# Patient Record
Sex: FEMALE | Race: ASIAN | NOT HISPANIC OR LATINO | URBAN - METROPOLITAN AREA
[De-identification: names, ages, dates, MRNs, and addresses within clinical notes are randomized per-mention and may not be internally consistent; named-entity substitution may affect disease eponyms.]

---

## 2022-10-27 ENCOUNTER — EMERGENCY (EMERGENCY)
Facility: HOSPITAL | Age: 52
LOS: 1 days | Discharge: HOME | End: 2022-10-27
Attending: EMERGENCY MEDICINE
Payer: MEDICAID

## 2022-10-27 VITALS
RESPIRATION RATE: 20 BRPM | HEIGHT: 65 IN | TEMPERATURE: 98 F | HEART RATE: 92 BPM | SYSTOLIC BLOOD PRESSURE: 148 MMHG | DIASTOLIC BLOOD PRESSURE: 100 MMHG | OXYGEN SATURATION: 100 % | WEIGHT: 145.06 LBS

## 2022-10-27 LAB
ALBUMIN SERPL ELPH-MCNC: 4.7 G/DL — SIGNIFICANT CHANGE UP (ref 3.3–5)
ALP SERPL-CCNC: 110 U/L — SIGNIFICANT CHANGE UP (ref 40–120)
ALT FLD-CCNC: 26 U/L — SIGNIFICANT CHANGE UP (ref 10–45)
ANION GAP SERPL CALC-SCNC: 11 MMOL/L — SIGNIFICANT CHANGE UP (ref 5–17)
APPEARANCE UR: CLEAR — SIGNIFICANT CHANGE UP
APTT BLD: 30.5 SEC — SIGNIFICANT CHANGE UP (ref 27.5–35.5)
AST SERPL-CCNC: 21 U/L — SIGNIFICANT CHANGE UP (ref 10–40)
BACTERIA # UR AUTO: ABNORMAL
BASE EXCESS BLDV CALC-SCNC: 2.5 MMOL/L — SIGNIFICANT CHANGE UP (ref -2–3)
BASOPHILS # BLD AUTO: 0.03 K/UL — SIGNIFICANT CHANGE UP (ref 0–0.2)
BASOPHILS NFR BLD AUTO: 0.5 % — SIGNIFICANT CHANGE UP (ref 0–2)
BILIRUB SERPL-MCNC: 0.3 MG/DL — SIGNIFICANT CHANGE UP (ref 0.2–1.2)
BILIRUB UR-MCNC: NEGATIVE — SIGNIFICANT CHANGE UP
BUN SERPL-MCNC: 8 MG/DL — SIGNIFICANT CHANGE UP (ref 7–23)
CA-I SERPL-SCNC: 1.26 MMOL/L — SIGNIFICANT CHANGE UP (ref 1.15–1.33)
CALCIUM SERPL-MCNC: 10.4 MG/DL — SIGNIFICANT CHANGE UP (ref 8.4–10.5)
CHLORIDE BLDV-SCNC: 103 MMOL/L — SIGNIFICANT CHANGE UP (ref 96–108)
CHLORIDE SERPL-SCNC: 103 MMOL/L — SIGNIFICANT CHANGE UP (ref 96–108)
CO2 BLDV-SCNC: 29 MMOL/L — HIGH (ref 22–26)
CO2 SERPL-SCNC: 26 MMOL/L — SIGNIFICANT CHANGE UP (ref 22–31)
COLOR SPEC: COLORLESS — SIGNIFICANT CHANGE UP
CREAT SERPL-MCNC: 0.77 MG/DL — SIGNIFICANT CHANGE UP (ref 0.5–1.3)
DIFF PNL FLD: NEGATIVE — SIGNIFICANT CHANGE UP
EGFR: 93 ML/MIN/1.73M2 — SIGNIFICANT CHANGE UP
EOSINOPHIL # BLD AUTO: 0.16 K/UL — SIGNIFICANT CHANGE UP (ref 0–0.5)
EOSINOPHIL NFR BLD AUTO: 2.5 % — SIGNIFICANT CHANGE UP (ref 0–6)
EPI CELLS # UR: 5 /HPF — SIGNIFICANT CHANGE UP
GAS PNL BLDV: 136 MMOL/L — SIGNIFICANT CHANGE UP (ref 136–145)
GAS PNL BLDV: SIGNIFICANT CHANGE UP
GAS PNL BLDV: SIGNIFICANT CHANGE UP
GLUCOSE BLDV-MCNC: 94 MG/DL — SIGNIFICANT CHANGE UP (ref 70–99)
GLUCOSE SERPL-MCNC: 94 MG/DL — SIGNIFICANT CHANGE UP (ref 70–99)
GLUCOSE UR QL: NEGATIVE — SIGNIFICANT CHANGE UP
HCG UR QL: NEGATIVE — SIGNIFICANT CHANGE UP
HCO3 BLDV-SCNC: 28 MMOL/L — SIGNIFICANT CHANGE UP (ref 22–29)
HCT VFR BLD CALC: 42 % — SIGNIFICANT CHANGE UP (ref 34.5–45)
HCT VFR BLDA CALC: 41 % — SIGNIFICANT CHANGE UP (ref 34.5–46.5)
HGB BLD CALC-MCNC: 13.7 G/DL — SIGNIFICANT CHANGE UP (ref 11.7–16.1)
HGB BLD-MCNC: 14 G/DL — SIGNIFICANT CHANGE UP (ref 11.5–15.5)
HYALINE CASTS # UR AUTO: 1 /LPF — SIGNIFICANT CHANGE UP (ref 0–2)
IMM GRANULOCYTES NFR BLD AUTO: 0.3 % — SIGNIFICANT CHANGE UP (ref 0–0.9)
INR BLD: 1.05 RATIO — SIGNIFICANT CHANGE UP (ref 0.88–1.16)
KETONES UR-MCNC: NEGATIVE — SIGNIFICANT CHANGE UP
LACTATE BLDV-MCNC: 1.3 MMOL/L — SIGNIFICANT CHANGE UP (ref 0.5–2)
LEUKOCYTE ESTERASE UR-ACNC: ABNORMAL
LIDOCAIN IGE QN: 48 U/L — SIGNIFICANT CHANGE UP (ref 7–60)
LYMPHOCYTES # BLD AUTO: 2.84 K/UL — SIGNIFICANT CHANGE UP (ref 1–3.3)
LYMPHOCYTES # BLD AUTO: 44.2 % — HIGH (ref 13–44)
MCHC RBC-ENTMCNC: 28.6 PG — SIGNIFICANT CHANGE UP (ref 27–34)
MCHC RBC-ENTMCNC: 33.3 GM/DL — SIGNIFICANT CHANGE UP (ref 32–36)
MCV RBC AUTO: 85.7 FL — SIGNIFICANT CHANGE UP (ref 80–100)
MONOCYTES # BLD AUTO: 0.38 K/UL — SIGNIFICANT CHANGE UP (ref 0–0.9)
MONOCYTES NFR BLD AUTO: 5.9 % — SIGNIFICANT CHANGE UP (ref 2–14)
NEUTROPHILS # BLD AUTO: 2.99 K/UL — SIGNIFICANT CHANGE UP (ref 1.8–7.4)
NEUTROPHILS NFR BLD AUTO: 46.6 % — SIGNIFICANT CHANGE UP (ref 43–77)
NITRITE UR-MCNC: NEGATIVE — SIGNIFICANT CHANGE UP
NRBC # BLD: 0 /100 WBCS — SIGNIFICANT CHANGE UP (ref 0–0)
PCO2 BLDV: 45 MMHG — HIGH (ref 39–42)
PH BLDV: 7.4 — SIGNIFICANT CHANGE UP (ref 7.32–7.43)
PH UR: 7 — SIGNIFICANT CHANGE UP (ref 5–8)
PLATELET # BLD AUTO: 265 K/UL — SIGNIFICANT CHANGE UP (ref 150–400)
PO2 BLDV: 43 MMHG — SIGNIFICANT CHANGE UP (ref 25–45)
POTASSIUM BLDV-SCNC: 4 MMOL/L — SIGNIFICANT CHANGE UP (ref 3.5–5.1)
POTASSIUM SERPL-MCNC: 4.6 MMOL/L — SIGNIFICANT CHANGE UP (ref 3.5–5.3)
POTASSIUM SERPL-SCNC: 4.6 MMOL/L — SIGNIFICANT CHANGE UP (ref 3.5–5.3)
PROT SERPL-MCNC: 8.2 G/DL — SIGNIFICANT CHANGE UP (ref 6–8.3)
PROT UR-MCNC: NEGATIVE — SIGNIFICANT CHANGE UP
PROTHROM AB SERPL-ACNC: 12.2 SEC — SIGNIFICANT CHANGE UP (ref 10.5–13.4)
RBC # BLD: 4.9 M/UL — SIGNIFICANT CHANGE UP (ref 3.8–5.2)
RBC # FLD: 13.2 % — SIGNIFICANT CHANGE UP (ref 10.3–14.5)
RBC CASTS # UR COMP ASSIST: 0 /HPF — SIGNIFICANT CHANGE UP (ref 0–4)
SAO2 % BLDV: 74.6 % — SIGNIFICANT CHANGE UP (ref 67–88)
SODIUM SERPL-SCNC: 140 MMOL/L — SIGNIFICANT CHANGE UP (ref 135–145)
SP GR SPEC: 1 — LOW (ref 1.01–1.02)
TROPONIN T, HIGH SENSITIVITY RESULT: <6 NG/L — SIGNIFICANT CHANGE UP (ref 0–51)
UROBILINOGEN FLD QL: NEGATIVE — SIGNIFICANT CHANGE UP
WBC # BLD: 6.42 K/UL — SIGNIFICANT CHANGE UP (ref 3.8–10.5)
WBC # FLD AUTO: 6.42 K/UL — SIGNIFICANT CHANGE UP (ref 3.8–10.5)
WBC UR QL: 11 /HPF — HIGH (ref 0–5)

## 2022-10-27 PROCEDURE — 82803 BLOOD GASES ANY COMBINATION: CPT

## 2022-10-27 PROCEDURE — 82435 ASSAY OF BLOOD CHLORIDE: CPT

## 2022-10-27 PROCEDURE — 87086 URINE CULTURE/COLONY COUNT: CPT

## 2022-10-27 PROCEDURE — 84295 ASSAY OF SERUM SODIUM: CPT

## 2022-10-27 PROCEDURE — 80053 COMPREHEN METABOLIC PANEL: CPT

## 2022-10-27 PROCEDURE — 99285 EMERGENCY DEPT VISIT HI MDM: CPT | Mod: 25

## 2022-10-27 PROCEDURE — 74177 CT ABD & PELVIS W/CONTRAST: CPT | Mod: MA

## 2022-10-27 PROCEDURE — 81001 URINALYSIS AUTO W/SCOPE: CPT

## 2022-10-27 PROCEDURE — 84484 ASSAY OF TROPONIN QUANT: CPT

## 2022-10-27 PROCEDURE — 83690 ASSAY OF LIPASE: CPT

## 2022-10-27 PROCEDURE — 87186 SC STD MICRODIL/AGAR DIL: CPT

## 2022-10-27 PROCEDURE — 82947 ASSAY GLUCOSE BLOOD QUANT: CPT

## 2022-10-27 PROCEDURE — 83605 ASSAY OF LACTIC ACID: CPT

## 2022-10-27 PROCEDURE — 93005 ELECTROCARDIOGRAM TRACING: CPT

## 2022-10-27 PROCEDURE — 74177 CT ABD & PELVIS W/CONTRAST: CPT | Mod: 26,MA

## 2022-10-27 PROCEDURE — 36415 COLL VENOUS BLD VENIPUNCTURE: CPT

## 2022-10-27 PROCEDURE — 99285 EMERGENCY DEPT VISIT HI MDM: CPT

## 2022-10-27 PROCEDURE — 85018 HEMOGLOBIN: CPT

## 2022-10-27 PROCEDURE — 87040 BLOOD CULTURE FOR BACTERIA: CPT

## 2022-10-27 PROCEDURE — 82330 ASSAY OF CALCIUM: CPT

## 2022-10-27 PROCEDURE — 85610 PROTHROMBIN TIME: CPT

## 2022-10-27 PROCEDURE — 85730 THROMBOPLASTIN TIME PARTIAL: CPT

## 2022-10-27 PROCEDURE — 81025 URINE PREGNANCY TEST: CPT

## 2022-10-27 PROCEDURE — 84132 ASSAY OF SERUM POTASSIUM: CPT

## 2022-10-27 PROCEDURE — 85014 HEMATOCRIT: CPT

## 2022-10-27 PROCEDURE — 85025 COMPLETE CBC W/AUTO DIFF WBC: CPT

## 2022-10-27 RX ORDER — CEFPODOXIME PROXETIL 100 MG
1 TABLET ORAL
Qty: 20 | Refills: 0
Start: 2022-10-27 | End: 2022-11-05

## 2022-10-27 RX ORDER — CEFPODOXIME PROXETIL 100 MG
100 TABLET ORAL EVERY 12 HOURS
Refills: 0 | Status: DISCONTINUED | OUTPATIENT
Start: 2022-10-27 | End: 2022-10-31

## 2022-10-27 RX ORDER — SODIUM CHLORIDE 9 MG/ML
2000 INJECTION, SOLUTION INTRAVENOUS ONCE
Refills: 0 | Status: COMPLETED | OUTPATIENT
Start: 2022-10-27 | End: 2022-10-27

## 2022-10-27 RX ORDER — ACETAMINOPHEN 500 MG
650 TABLET ORAL ONCE
Refills: 0 | Status: COMPLETED | OUTPATIENT
Start: 2022-10-27 | End: 2022-10-27

## 2022-10-27 RX ADMIN — Medication 100 MILLIGRAM(S): at 23:39

## 2022-10-27 RX ADMIN — Medication 650 MILLIGRAM(S): at 21:46

## 2022-10-27 RX ADMIN — SODIUM CHLORIDE 2000 MILLILITER(S): 9 INJECTION, SOLUTION INTRAVENOUS at 19:00

## 2022-10-27 NOTE — ED ADULT NURSE REASSESSMENT NOTE - NS ED NURSE REASSESS COMMENT FT1
Report received by ASHKAN Deluca. Pt received A&Ox4 c/o of HA and abd tenderness, given PO pain medication as per MD order. Vials signs taken. Pt updated on plan of care and verbalized understanding. Awaiting results for CT scan.

## 2022-10-27 NOTE — ED PROVIDER NOTE - PHYSICAL EXAMINATION
GENERAL: Alert. No acute distress.   EYES: EOMI grossly normal. Anicteric.  HENT: Moist mucous membranes.   RESP: No conversation dyspnea, CTAB  CARDIOVASCULAR: RRR, no m/g/r  ABDOMEN: soft, LUQ and LLQ ttp, no rebound no guarding, no CVA tenderness  EXTREMITIES: ROM grossly normal in all 4 extremities. No deformities  SKIN: warm and dry  NEUROLOGIC: Alert and oriented x3  PSYCHIATRIC: Cooperative. Appropriate mood and affect

## 2022-10-27 NOTE — ED PROVIDER NOTE - CHIEF COMPLAINT
The patient is a 51y Female complaining of  The patient is a 51y Female complaining of left sided abd pain

## 2022-10-27 NOTE — ED PROVIDER NOTE - OBJECTIVE STATEMENT
50 yo with PMHx of diverticulosis, HTN, and hypothyroidism, presents for left sided abd pain. Pt lives at [facility], arrived in [place] on Oct 16th. Seen at [facility] for left sided abd pain on Oct 18th. CT A/p without contrast showed diverticulosis. Pt was seen at [facility] again on Oct 25th (two days ago) for left sided abd pain, dx with UTI, given a dose of macrobid in the hospital. Did not  her script for the rest of the macrobid. Today, presented to this ED for worsening burning left sided abdominal pain, with radiation from LLQ to LUQ. Also reports back pain. No N/V, some diarrhea, some maroon mucus in stool. + chills. No prior colonoscopy.

## 2022-10-27 NOTE — ED PROVIDER NOTE - PROGRESS NOTE DETAILS
Geoff PGY 2 Received sign out regarding patient, will follow up with labs, imaging.   currently pending ct scan and labs Patient notes improvement in pain from 10/10 to 5/10

## 2022-10-27 NOTE — ED PROVIDER NOTE - NSICDXFAMILYHX_GEN_ALL_CORE_FT
FAMILY HISTORY:  Father  Still living? Unknown  Family history of kidney stone, Age at diagnosis: Age Unknown

## 2022-10-27 NOTE — ED ADULT NURSE NOTE - CAS TRG GEN SKIN CONDITION
Assessment and plan of treatment:	plan: labs, meds, urine, upreg, CT, reassess.   Warm/Dry 1 Principal Discharge DX:	Back pain  Assessment and plan of treatment:	plan: labs, meds, urine, upreg, CT, reassess.

## 2022-10-27 NOTE — ED PROVIDER NOTE - NSFOLLOWUPINSTRUCTIONS_ED_ALL_ED_FT
Today you were seen in the ED for abdominal pain.    It was found that you have  no signs of a medical emergency that needs further treatment in the hospital. We are going to treat you though for a suspected infection in your kidney.    Please take cefpodoxime 100mg every 12 hours, for 10 days.     You can take Tylenol and Motrin every 8 hours for pain as needed.     Please follow up with your gastroenterologists / PCP for continued care and management.     [address] [place] [address]  [phone]    In addition, please follow up with a nephrologist as well, information for such can be found below.     Please return to the ED if you have an increase in abdominal pain, nausea, vomiting or pain that is uncontrolled with your home pain relief.

## 2022-10-27 NOTE — ED PROVIDER NOTE - NS ED ROS FT
CONSTITUTIONAL: No fever or chill  HEENT: Denies changes in vision and hearing.  RESPIRATORY: Denies SOB and cough.  CV: Denies CP.   GI: (+) abdominal pain, diarrhea. (-) n/v  : Denies dysuria (+) urinary frequency.  MSK: Denies myalgia and joint pain. Back pain  SKIN: Denies rash   ?NEUROLOGICAL: Denies headache and syncope.

## 2022-10-27 NOTE — ED ADULT TRIAGE NOTE - CHIEF COMPLAINT QUOTE
pt left side abd pain x 1 1/2 wks seen at [facility] 2 days ago put on abx for uti

## 2022-10-27 NOTE — ED PROVIDER NOTE - CLINICAL SUMMARY MEDICAL DECISION MAKING FREE TEXT BOX
52 yo F here for left sided abdominal pain ongoing for the past two weeks. Concern for UTI to pyelo, diverticulosis, other abdominal pathology. Lab, CT a/p with contrast, dispo pending work up. 52 yo F here for left sided abdominal pain ongoing for the past two weeks. Concern for UTI to pyelo, diverticulosis, other abdominal pathology. Lab, CT a/p with contrast, dispo pending work up.    LOVE Garcia MD: Agree with resident/ACP MDM, assessment and plan as above.

## 2022-10-27 NOTE — ED PROVIDER NOTE - NSFOLLOWUPCLINICS_GEN_ALL_ED_FT
Gastroenterology at [facility]  Gastroenterology  [address] [place] [address]  Phone: [phone]  Fax:   Follow Up Time: 1-3 Days    [facility]  Nephrology  [address] [place] [address]  Phone: [phone]  Fax:   Follow Up Time: 1-3 Days

## 2022-10-28 VITALS
OXYGEN SATURATION: 99 % | TEMPERATURE: 98 F | HEART RATE: 71 BPM | SYSTOLIC BLOOD PRESSURE: 153 MMHG | RESPIRATION RATE: 17 BRPM | DIASTOLIC BLOOD PRESSURE: 104 MMHG

## 2022-10-28 NOTE — ED ADULT NURSE REASSESSMENT NOTE - NS ED NURSE REASSESS COMMENT FT1
Pt provided discharge instructions and verbalized understanding. Vitals taken prior to discharge, MD Tellez made aware of elevated BP and notified pt to take her home BP meds. Pt ambulated independently and denies any difficulty walking. No acute distress and asymptomatic to elevated BP.

## 2022-11-01 PROBLEM — E03.9 HYPOTHYROIDISM, UNSPECIFIED: Chronic | Status: ACTIVE | Noted: 2022-10-27

## 2022-11-01 PROBLEM — I10 ESSENTIAL (PRIMARY) HYPERTENSION: Chronic | Status: ACTIVE | Noted: 2022-10-27

## 2022-11-01 PROBLEM — K57.90 DIVERTICULOSIS OF INTESTINE, PART UNSPECIFIED, WITHOUT PERFORATION OR ABSCESS WITHOUT BLEEDING: Chronic | Status: ACTIVE | Noted: 2022-10-27

## 2022-11-01 PROBLEM — Z00.00 ENCOUNTER FOR PREVENTIVE HEALTH EXAMINATION: Status: ACTIVE | Noted: 2022-11-01

## 2022-11-02 LAB
CULTURE RESULTS: SIGNIFICANT CHANGE UP
CULTURE RESULTS: SIGNIFICANT CHANGE UP
SPECIMEN SOURCE: SIGNIFICANT CHANGE UP
SPECIMEN SOURCE: SIGNIFICANT CHANGE UP

## 2022-12-30 ENCOUNTER — APPOINTMENT (OUTPATIENT)
Dept: NEPHROLOGY | Facility: CLINIC | Age: 52
End: 2022-12-30
Payer: COMMERCIAL

## 2022-12-30 VITALS
OXYGEN SATURATION: 98 % | DIASTOLIC BLOOD PRESSURE: 76 MMHG | BODY MASS INDEX: 26.52 KG/M2 | WEIGHT: 165 LBS | HEIGHT: 66 IN | HEART RATE: 78 BPM | SYSTOLIC BLOOD PRESSURE: 128 MMHG | RESPIRATION RATE: 16 BRPM

## 2022-12-30 DIAGNOSIS — N20.0 CALCULUS OF KIDNEY: ICD-10-CM

## 2022-12-30 DIAGNOSIS — N39.0 URINARY TRACT INFECTION, SITE NOT SPECIFIED: ICD-10-CM

## 2022-12-30 DIAGNOSIS — Z86.79 PERSONAL HISTORY OF OTHER DISEASES OF THE CIRCULATORY SYSTEM: ICD-10-CM

## 2022-12-30 DIAGNOSIS — Z86.39 PERSONAL HISTORY OF OTHER ENDOCRINE, NUTRITIONAL AND METABOLIC DISEASE: ICD-10-CM

## 2022-12-30 DIAGNOSIS — N28.1 CYST OF KIDNEY, ACQUIRED: ICD-10-CM

## 2022-12-30 PROCEDURE — 99203 OFFICE O/P NEW LOW 30 MIN: CPT

## 2022-12-30 RX ORDER — LEVOTHYROXINE SODIUM 0.09 MG/1
88 TABLET ORAL DAILY
Refills: 0 | Status: ACTIVE | COMMUNITY
Start: 2022-12-30

## 2022-12-30 RX ORDER — AMLODIPINE BESYLATE 5 MG/1
5 TABLET ORAL DAILY
Qty: 30 | Refills: 3 | Status: ACTIVE | COMMUNITY
Start: 2022-12-30

## 2022-12-30 NOTE — ASSESSMENT
[FreeTextEntry1] : 1. The patient had a renal US in [facility] and was told she had several cysts.  On the basis of this information I have requested an US to be done in January for my review and comments. The patient will call my office after the test is done.\par 2 Recurrent UTI. The patient states that she usually has 2 to 3 UTI per year. She is scheduled for a GYN evaluation in January.  \par 3. History of nephrolithiasis. Will review if stone are present in US and plan accordingly.\par Follow up as necessary. \par I have discussed the above w/ the patient and her  who showed full understanding of the situation.

## 2022-12-30 NOTE — PHYSICAL EXAM
[General Appearance - Alert] : alert [General Appearance - In No Acute Distress] : in no acute distress [Sclera] : the sclera and conjunctiva were normal [Outer Ear] : the ears and nose were normal in appearance [Jugular Venous Distention Increased] : there was no jugular-venous distention [Auscultation Breath Sounds / Voice Sounds] : lungs were clear to auscultation bilaterally [Murmurs] : no murmurs [Edema] : there was no peripheral edema [Abdomen Tenderness] : non-tender [] : no hepato-splenomegaly [Urinary Bladder Findings] : the bladder was normal on palpation [No CVA Tenderness] : no ~M costovertebral angle tenderness [Abnormal Walk] : normal gait [No Focal Deficits] : no focal deficits [Oriented To Time, Place, And Person] : oriented to person, place, and time

## 2022-12-30 NOTE — REASON FOR VISIT
[Consultation] : a consultation visit [Spouse] : spouse [FreeTextEntry1] : Referred for recurrent UTI, renal cyst on US and history of nephrolithiasis.

## 2022-12-30 NOTE — CONSULT LETTER
[Dear  ___] : Dear  [unfilled], [Courtesy Letter:] : I had the pleasure of seeing your patient, [unfilled], in my office today. [Please see my note below.] : Please see my note below. [Consult Closing:] : Thank you very much for allowing me to participate in the care of this patient.  If you have any questions, please do not hesitate to contact me. [Sincerely,] : Sincerely, [FreeTextEntry2] : Dr. Roshni Acuña\par [address] [facility]\[place] [address] [FreeTextEntry1] : I had the pleasure of seen this patient in my office on December 30.  She was referred from the ED at [facility] where she presented in October 2022 w/ left flank pain and was given an antibiotic for a UTI.  Her renal function was normal. The patient has history of renal cysts and kidney stones. I have ordered a renal US and will forward the results to your office when available. Her blood pressure was normal on her current medications.  [FreeTextEntry3] : Christiano Bar MD

## 2022-12-30 NOTE — HISTORY OF PRESENT ILLNESS
[FreeTextEntry1] : Referred after an ED visit at [facility] in October when she presented w/ left flank pain and urinalysis suggestive of UTI. Treated w/ oral antibiotics and discharged.  The patient has had UTI 2 to 3 times per year but has never required IV antibiotics/admission to the hospital.  She gives history of nephrolithiasis the last episode was few years ago.  She had a renal US done in [facility] and was told that her kidneys had few cysts. No mention of stones. \par The patient was told to follow up w/ a nephrologist upon discharge from the ED.  She is here for this reason. Review of the ED chart shows a normal renal function and electrolytes.

## 2023-01-03 ENCOUNTER — APPOINTMENT (OUTPATIENT)
Dept: GASTROENTEROLOGY | Facility: CLINIC | Age: 53
End: 2023-01-03